# Patient Record
Sex: FEMALE | Race: WHITE | NOT HISPANIC OR LATINO | Employment: FULL TIME | URBAN - METROPOLITAN AREA
[De-identification: names, ages, dates, MRNs, and addresses within clinical notes are randomized per-mention and may not be internally consistent; named-entity substitution may affect disease eponyms.]

---

## 2017-05-17 ENCOUNTER — OFFICE VISIT (OUTPATIENT)
Dept: OBSTETRICS AND GYNECOLOGY | Facility: CLINIC | Age: 32
End: 2017-05-17
Payer: MEDICAID

## 2017-05-17 VITALS — HEIGHT: 63 IN | WEIGHT: 148 LBS | BODY MASS INDEX: 26.22 KG/M2

## 2017-05-17 DIAGNOSIS — B00.9 HSV (HERPES SIMPLEX VIRUS) INFECTION: ICD-10-CM

## 2017-05-17 PROCEDURE — 99202 OFFICE O/P NEW SF 15 MIN: CPT | Mod: PBBFAC,PO | Performed by: ADVANCED PRACTICE MIDWIFE

## 2017-05-17 PROCEDURE — 99999 PR PBB SHADOW E&M-NEW PATIENT-LVL II: CPT | Mod: PBBFAC,,, | Performed by: ADVANCED PRACTICE MIDWIFE

## 2017-05-17 PROCEDURE — 99203 OFFICE O/P NEW LOW 30 MIN: CPT | Mod: S$PBB,,, | Performed by: ADVANCED PRACTICE MIDWIFE

## 2017-05-17 RX ORDER — VALACYCLOVIR HYDROCHLORIDE 500 MG/1
500 TABLET, FILM COATED ORAL DAILY
Qty: 30 TABLET | Refills: 2 | Status: SHIPPED | OUTPATIENT
Start: 2017-05-17 | End: 2021-10-25

## 2017-05-17 NOTE — MR AVS SNAPSHOT
[facility]  [address] [place] [address]  Phone: [phone]  Fax: [phone]                  Shannon De Jesus   2017 2:00 PM   Office Visit    Description:  Female : 1985   Provider:  Sil Barrientos CNM   Department:  [facility]           Reason for Visit     Risk assessment           Diagnoses this Visit        Comments    HSV (herpes simplex virus) infection                To Do List           Future Appointments        Provider Department Dept Phone    2017 8:00 AM Malorie Musa CNM [facility]/ -[phone]      Goals (5 Years of Data)     None      Follow-Up and Disposition     Return in about 2 weeks (around 2017).    Follow-up and Disposition History       These Medications        Disp Refills Start End    valacyclovir (VALTREX) 500 MG tablet 30 tablet 2 2017 8/15/2017    Take 1 tablet (500 mg total) by mouth once daily. - Oral    Pharmacy: [facility] [address] [place] [address] #: [phone]         [facility]     [facility] On Call Nurse Care Line -  Assistance  Unless otherwise directed by your provider, please contact [facility], our nurse care line that is available for  assistance.     Registered nurses in the [facility] provide: appointment scheduling, clinical advisement, health education, and other advisory services.  Call: [phone] (toll free)               Medications           Message regarding Medications     Verify the changes and/or additions to your medication regime listed below are the same as discussed with your clinician today.  If any of these changes or additions are incorrect, please notify your healthcare provider.        START taking these NEW medications        Refills    valacyclovir (VALTREX) 500 MG tablet 2    Sig: Take 1 tablet (500 mg total) by mouth once daily.    Class: Normal    Route: Oral           Verify that the below list of medications  "is an accurate representation of the medications you are currently taking.  If none reported, the list may be blank. If incorrect, please contact your healthcare provider. Carry this list with you in case of emergency.           Current Medications     valacyclovir (VALTREX) 500 MG tablet Take 1 tablet (500 mg total) by mouth once daily.           Clinical Reference Information           Your Vitals Were     Height Weight Last Period BMI       5' 3" (1.6 m) 67.1 kg (148 lb) 04/08/2017 (Exact Date) 26.22 kg/m2       Allergies as of 5/17/2017     No Known Allergies      Immunizations Administered on Date of Encounter - 5/17/2017     None      [facility] Sign-Up     Activating your [facility] account is as easy as 1-2-3!     1) Visit [email or web address], select Sign Up Now, enter this activation code and your date of birth, then select Next.  [email or web address]  Expires: 7/1/2017  2:42 PM      2) Create a username and password to use when you visit [facility] in the future and select a security question in case you lose your password and select Next.    3) Enter your e-mail address and click Sign Up!    Additional Information  If you have questions, please e-mail [email or web address] or call [phone] to talk to our [facility] staff. Remember, [facility] is NOT to be used for urgent needs. For medical emergencies, dial 911.         Smoking Cessation     If you would like to quit smoking:   You may be eligible for free services if you are a [place] resident and started smoking cigarettes before September 1, 1988.  Call the Smoking Cessation [employer] (SCT) toll free at (599) 987-6646 or (028) 234-0131.   Call 5-800-QUIT-NOW if you do not meet the above criteria.   Contact us via email: [email or web address]   View our website for more information: [email or web address]        Language Assistance Services     ATTENTION: Language assistance services are available, free of charge. Please call [phone].  "     ATENCIÓN: Si habla español, tiene a garcia disposición servicios gratuitos de asistencia lingüística. Rinku al [phone].     CHÚ Ý: N?u b?n nói Ti?ng Vi?t, có các d?ch v? h? tr? ngôn ng? mi?n phí dành cho b?n. G?i s? [phone].         [facility] complies with applicable Federal civil rights laws and does not discriminate on the basis of race, color, national origin, age, disability, or sex.

## 2017-05-17 NOTE — PROGRESS NOTES
"  Subjective:      Shannon De Jesus is a 31 y.o. female who presents for evaluation of amenorrhea. She believes she could be pregnant. Pregnancy is not desired. Sexual Activity: single partner, contraception: none. Current symptoms also include: positive home pregnancy test. Last period was normal.Has current HSV outbreak     Patient's last menstrual period was 2017 (exact date).  The following portions of the patient's history were reviewed and updated as appropriate: allergies, current medications, past family history, past medical history, past social history, past surgical history and problem list.    Review of Systems  A comprehensive review of systems was negative.       Objective:      Ht 5' 3" (1.6 m)  Wt 67.1 kg (148 lb)  LMP 2017 (Exact Date)  BMI 26.22 kg/m2  General: alert, appears stated age, no distress and no acute distress      Lab Review  Urine HCG: positive      Assessment:      Absence of menstruation.       Plan:      Pregnancy Test: Positive: EDC: 1/15/18. Briefly discussed pre- care options. Pregnancy, Childbirth and the [place] book given. Encouraged well-balanced diet, plenty of rest when needed, pre-gorge vitamins daily and walking for exercise. Discussed self-help for nausea, avoiding OTC medications until consulting provider or pharmacist, other than Tylenol as needed, minimal caffeine (1-2 cups daily) and avoiding alcohol. She will schedule her initial OB visit in the next month with her PCP or OB provider. Feel free to call with any questions. patient plans termination  Will return for visit and birth control after termination  "

## 2018-08-04 ENCOUNTER — HOSPITAL ENCOUNTER (EMERGENCY)
Facility: HOSPITAL | Age: 33
Discharge: HOME | End: 2018-08-04
Attending: EMERGENCY MEDICINE
Payer: MEDICAID

## 2018-08-04 VITALS
WEIGHT: 170 LBS | TEMPERATURE: 98 F | BODY MASS INDEX: 30.11 KG/M2 | DIASTOLIC BLOOD PRESSURE: 63 MMHG | SYSTOLIC BLOOD PRESSURE: 112 MMHG | OXYGEN SATURATION: 97 % | RESPIRATION RATE: 18 BRPM | HEART RATE: 74 BPM

## 2018-08-04 DIAGNOSIS — I10 HYPERTENSION, UNSPECIFIED TYPE: ICD-10-CM

## 2018-08-04 DIAGNOSIS — O20.0 THREATENED MISCARRIAGE: Primary | ICD-10-CM

## 2018-08-04 DIAGNOSIS — Z67.91 RH NEGATIVE, ANTEPARTUM, FIRST TRIMESTER: ICD-10-CM

## 2018-08-04 DIAGNOSIS — O26.891 RH NEGATIVE, ANTEPARTUM, FIRST TRIMESTER: ICD-10-CM

## 2018-08-04 LAB
ABO + RH BLD: NORMAL
ALBUMIN SERPL BCP-MCNC: 3.6 G/DL
ALP SERPL-CCNC: 44 U/L
ALT SERPL W/O P-5'-P-CCNC: 11 U/L
ANION GAP SERPL CALC-SCNC: 9 MMOL/L
AST SERPL-CCNC: 16 U/L
BASOPHILS # BLD AUTO: 0.01 K/UL
BASOPHILS NFR BLD: 0.1 %
BILIRUB SERPL-MCNC: 0.5 MG/DL
BILIRUB UR QL STRIP: NEGATIVE
BUN SERPL-MCNC: 6 MG/DL
CALCIUM SERPL-MCNC: 9.1 MG/DL
CHLORIDE SERPL-SCNC: 106 MMOL/L
CLARITY UR: CLEAR
CO2 SERPL-SCNC: 22 MMOL/L
COLOR UR: YELLOW
CREAT SERPL-MCNC: 0.7 MG/DL
DIFFERENTIAL METHOD: NORMAL
EOSINOPHIL # BLD AUTO: 0.3 K/UL
EOSINOPHIL NFR BLD: 3.6 %
ERYTHROCYTE [DISTWIDTH] IN BLOOD BY AUTOMATED COUNT: 14 %
EST. GFR  (AFRICAN AMERICAN): >60 ML/MIN/1.73 M^2
EST. GFR  (NON AFRICAN AMERICAN): >60 ML/MIN/1.73 M^2
GLUCOSE SERPL-MCNC: 91 MG/DL
GLUCOSE UR QL STRIP: NEGATIVE
HCG INTACT+B SERPL-ACNC: NORMAL MIU/ML
HCT VFR BLD AUTO: 39.3 %
HGB BLD-MCNC: 13.4 G/DL
HGB UR QL STRIP: NEGATIVE
INJECT RH IG VOL PATIENT: NORMAL ML
KETONES UR QL STRIP: NEGATIVE
LEUKOCYTE ESTERASE UR QL STRIP: NEGATIVE
LYMPHOCYTES # BLD AUTO: 1.7 K/UL
LYMPHOCYTES NFR BLD: 20.7 %
MCH RBC QN AUTO: 30.1 PG
MCHC RBC AUTO-ENTMCNC: 34.1 G/DL
MCV RBC AUTO: 88 FL
MONOCYTES # BLD AUTO: 0.5 K/UL
MONOCYTES NFR BLD: 6.5 %
NEUTROPHILS # BLD AUTO: 5.8 K/UL
NEUTROPHILS NFR BLD: 69.1 %
NITRITE UR QL STRIP: NEGATIVE
PH UR STRIP: 8 [PH] (ref 5–8)
PLATELET # BLD AUTO: 196 K/UL
PMV BLD AUTO: 10.5 FL
POTASSIUM SERPL-SCNC: 4 MMOL/L
PROT SERPL-MCNC: 6.8 G/DL
PROT UR QL STRIP: NEGATIVE
RBC # BLD AUTO: 4.45 M/UL
SODIUM SERPL-SCNC: 137 MMOL/L
SP GR UR STRIP: 1.01 (ref 1–1.03)
URN SPEC COLLECT METH UR: NORMAL
UROBILINOGEN UR STRIP-ACNC: NEGATIVE EU/DL
WBC # BLD AUTO: 8.34 K/UL

## 2018-08-04 PROCEDURE — 86901 BLOOD TYPING SEROLOGIC RH(D): CPT

## 2018-08-04 PROCEDURE — 99285 EMERGENCY DEPT VISIT HI MDM: CPT | Mod: 25

## 2018-08-04 PROCEDURE — 63600519 RHOGAM PHARM REV CODE 636 ALT 250 W HCPCS: Performed by: PHYSICIAN ASSISTANT

## 2018-08-04 PROCEDURE — 84702 CHORIONIC GONADOTROPIN TEST: CPT

## 2018-08-04 PROCEDURE — 81003 URINALYSIS AUTO W/O SCOPE: CPT

## 2018-08-04 PROCEDURE — 85025 COMPLETE CBC W/AUTO DIFF WBC: CPT

## 2018-08-04 PROCEDURE — 80053 COMPREHEN METABOLIC PANEL: CPT

## 2018-08-04 RX ADMIN — HUMAN RHO(D) IMMUNE GLOBULIN 300 MCG: 300 INJECTION, SOLUTION INTRAMUSCULAR at 01:08

## 2018-08-04 NOTE — ED PROVIDER NOTES
"Encounter Date: 2018       History     Chief Complaint   Patient presents with    Vaginal Bleeding     vaginal bleeding, 12 weeks pregnant     Pt states she is approximately 12 weeks pregnant by last menstrual period.  Pt has no prenatal care.  Pt denies trauma/fall or other precipitating event prior to onset of bleeding, which she describes as "spotting of old blood."      The history is provided by the patient.   Vaginal Bleeding   This is a new problem. The current episode started 6 to 12 hours ago. The problem occurs rarely. The problem has not changed since onset.Pertinent negatives include no chest pain, no abdominal pain, no headaches and no shortness of breath. Nothing aggravates the symptoms. Nothing relieves the symptoms. She has tried nothing for the symptoms.     Review of patient's allergies indicates:  No Known Allergies  History reviewed. No pertinent past medical history.  Past Surgical History:   Procedure Laterality Date     SECTION      x 1    WISDOM TOOTH EXTRACTION       Family History   Problem Relation Age of Onset    Miscarriages / Stillbirths Mother     Breast cancer Neg Hx     Cancer Neg Hx     Colon cancer Neg Hx     Diabetes Neg Hx     Eclampsia Neg Hx     Ovarian cancer Neg Hx      labor Neg Hx     Stroke Neg Hx      Social History   Substance Use Topics    Smoking status: Current Some Day Smoker    Smokeless tobacco: Not on file    Alcohol use No     Review of Systems   Constitutional: Negative for chills and fever.   HENT: Negative for sore throat.    Eyes: Negative for photophobia and redness.   Respiratory: Negative for cough and shortness of breath.    Cardiovascular: Negative for chest pain.   Gastrointestinal: Negative for abdominal pain, diarrhea and nausea.   Endocrine: Negative for polydipsia and polyphagia.   Genitourinary: Positive for vaginal bleeding. Negative for decreased urine volume, dysuria, frequency, genital sores, hematuria, " menstrual problem, pelvic pain, urgency, vaginal discharge and vaginal pain.   Musculoskeletal: Negative for arthralgias, back pain and myalgias.   Skin: Negative for rash.   Neurological: Negative for weakness and headaches.   Hematological: Does not bruise/bleed easily.   Psychiatric/Behavioral: The patient is not nervous/anxious.    All other systems reviewed and are negative.      Physical Exam     Initial Vitals [08/04/18 1015]   BP Pulse Resp Temp SpO2   (!) 143/67 67 18 97.9 °F (36.6 °C) 97 %      MAP       --         Physical Exam    Nursing note and vitals reviewed.  Constitutional: Vital signs are normal. She appears well-developed and well-nourished. No distress.   HENT:   Head: Normocephalic and atraumatic.   Right Ear: External ear normal.   Left Ear: External ear normal.   Nose: Nose normal.   Mouth/Throat: Oropharynx is clear and moist.   Eyes: Conjunctivae, EOM and lids are normal. Pupils are equal, round, and reactive to light.   Neck: Normal range of motion and full passive range of motion without pain. Neck supple.   Cardiovascular: Normal rate, regular rhythm, S1 normal, S2 normal, normal heart sounds, intact distal pulses and normal pulses.   Pulmonary/Chest: Breath sounds normal. No respiratory distress. She has no wheezes. She has no rales.   Abdominal: Soft. Normal appearance and bowel sounds are normal. She exhibits no distension. There is no tenderness.   Musculoskeletal: Normal range of motion.   Lymphadenopathy:     She has no cervical adenopathy.   Neurological: She is alert and oriented to person, place, and time. She has normal strength. No cranial nerve deficit or sensory deficit. Coordination and gait normal.   Skin: Skin is warm, dry and intact.   Psychiatric: She has a normal mood and affect. Her speech is normal and behavior is normal. Judgment and thought content normal. Cognition and memory are normal.         ED Course   Procedures  Labs Reviewed   COMPREHENSIVE METABOLIC PANEL  - Abnormal; Notable for the following:        Result Value    CO2 22 (*)     Alkaline Phosphatase 44 (*)     All other components within normal limits   CBC W/ AUTO DIFFERENTIAL   HCG, QUANTITATIVE, PREGNANCY   URINALYSIS   GROUP & RH   PREPARE RH IMMUNE GLOBULIN          Imaging Results          US OB Less Than 14 Wks First Gestation (Final result)  Result time 08/04/18 12:09:56   Procedure changed from US OB Less Than 14 Wks with Transvag(xpd     Final result by Ahsan Willson MD (08/04/18 12:09:56)                 Impression:      There is a single viable intrauterine pregnancy with estimated gestational age of 11 weeks and 6 days.  Heart rate is 157 beats per minute.      Electronically signed by: Aman Willson MD  Date:    08/04/2018  Time:    12:09             Narrative:    EXAMINATION:  US OB LESS THAN 14 WEEKS FIRST GESTATION    CLINICAL HISTORY:  Vag Bleeding;    TECHNIQUE:  Transabdominal 2nd or 3rd trimester obstetrical ultrasound was performed.    COMPARISON:  None.    FINDINGS:  Ultrasound imaging demonstrates a single intrauterine pregnancy.  Gestational sac is present with crown-rump length measuring 5.13 cm with an estimated gestational age of 11 weeks 6 days and estimated due date of February 11, 2019.  The heart rate measures 157 beats per minute.  No focal abnormality gestational sac is demonstrated.  The uterus measures 13.30 x 6.41 x 8.64 cm.  The right ovary measures 2.1 x 1.5 x 2.5 cm.  The left ovary measures 2.8 x 1.2 x 2.3 cm.  Bilaterally the ovaries have normal vascular flow and appear free of focal abnormality.  No suspicious mass or fluid collections are demonstrated.                                                      Clinical Impression:   The primary encounter diagnosis was Threatened miscarriage. Diagnoses of Rh negative, antepartum, first trimester and Hypertension, unspecified type were also pertinent to this visit.      Disposition:   Disposition: Discharged  Condition:  DICK Boo  08/04/18 0755

## 2020-02-16 ENCOUNTER — HISTORICAL (OUTPATIENT)
Dept: ADMINISTRATIVE | Facility: HOSPITAL | Age: 35
End: 2020-02-16

## 2023-12-26 ENCOUNTER — HOSPITAL ENCOUNTER (EMERGENCY)
Facility: HOSPITAL | Age: 38
Discharge: HOME | End: 2023-12-26
Attending: EMERGENCY MEDICINE
Payer: MEDICAID

## 2023-12-26 VITALS
DIASTOLIC BLOOD PRESSURE: 86 MMHG | RESPIRATION RATE: 16 BRPM | TEMPERATURE: 98 F | BODY MASS INDEX: 31.42 KG/M2 | SYSTOLIC BLOOD PRESSURE: 144 MMHG | WEIGHT: 177.38 LBS | HEART RATE: 66 BPM | OXYGEN SATURATION: 100 %

## 2023-12-26 DIAGNOSIS — U07.1 COVID: Primary | ICD-10-CM

## 2023-12-26 DIAGNOSIS — Z76.0 MEDICATION REFILL: ICD-10-CM

## 2023-12-26 LAB
INFLUENZA A, MOLECULAR: NEGATIVE
INFLUENZA B, MOLECULAR: NEGATIVE
SARS-COV-2 RDRP RESP QL NAA+PROBE: POSITIVE
SPECIMEN SOURCE: NORMAL

## 2023-12-26 PROCEDURE — U0002 COVID-19 LAB TEST NON-CDC: HCPCS | Performed by: NURSE PRACTITIONER

## 2023-12-26 PROCEDURE — 99282 EMERGENCY DEPT VISIT SF MDM: CPT

## 2023-12-26 PROCEDURE — 87502 INFLUENZA DNA AMP PROBE: CPT | Performed by: NURSE PRACTITIONER

## 2023-12-26 RX ORDER — METFORMIN HYDROCHLORIDE 500 MG/1
500 TABLET ORAL
Qty: 30 TABLET | Refills: 1 | Status: SHIPPED | OUTPATIENT
Start: 2023-12-26

## 2023-12-26 NOTE — FIRST PROVIDER EVALUATION
Medical screening examination initiated.  I have conducted a focused provider triage encounter, findings are as follows:    Brief history of present illness:  patient presents to ER for sinus pressure, chills, weakness.    There were no vitals filed for this visit.    Pertinent physical exam:  no acute distress    Brief workup plan:  covid/influenza    Preliminary workup initiated; this workup will be continued and followed by the physician or advanced practice provider that is assigned to the patient when roomed.  
87

## 2023-12-26 NOTE — Clinical Note
"Shannon Phamjoaquín Palma was seen and treated in our emergency department on 12/26/2023.  She may return to work on 12/30/2023.       If you have any questions or concerns, please don't hesitate to call.      Alma Argueta, WANG"

## 2023-12-27 NOTE — ED PROVIDER NOTES
History      Chief Complaint   Patient presents with    General Illness     Pt c/o body aches, sinus pressure, weakness, dizzy, nausea, chills.  Symptoms began two days ago.       Review of patient's allergies indicates:  No Known Allergies     HPI   HPI    2023, 4:45 PM   History obtained from the patient      History of Present Illness: Shannon Palma is a 38 y.o. female patient who presents to the Emergency Department for flu-like symptoms, cough, nasal congestion, fever, body aches, for 2 days.       PCP: [facility]       Past Medical History:  Past Medical History:   Diagnosis Date    Chlamydia     Genital herpes     Hypertension in pregnancy          Past Surgical History:  Past Surgical History:   Procedure Laterality Date     SECTION      ,    WISDOM TOOTH EXTRACTION             Family History:  Family History   Problem Relation Age of Onset    Miscarriages / Stillbirths Mother     Diabetes Mother     Heart disease Maternal Grandmother     Lung cancer Maternal Grandmother     Hypertension Maternal Grandfather     Sickle cell anemia Other         Family Hx on baby's daddy's family hx    Prostate cancer Father 48    Skin cancer Maternal Aunt     Breast cancer Neg Hx     Cancer Neg Hx     Colon cancer Neg Hx     Eclampsia Neg Hx     Ovarian cancer Neg Hx      labor Neg Hx     Stroke Neg Hx            Social History:  Social History     Tobacco Use    Smoking status: Never    Smokeless tobacco: Never   Substance and Sexual Activity    Alcohol use: No    Drug use: No    Sexual activity: Yes     Partners: Male     Birth control/protection: I.U.D.     Comment: Lillie GALICIA   Review of Systems   Constitutional: Positive for chills and fever.     Respiratory: Positive for cough. Negative for shortness of breath.        Review of Systems    Physical Exam        Initial Vitals [23 1201]   BP Pulse Resp Temp SpO2   (!) 144/86 66 16 97.9 °F (36.6 °C) 100  %      MAP       --         Physical Exam  Vital signs and nursing notes reviewed.  Constitutional: Patient is in NAD. Awake and alert. Well-developed and well-nourished.  Head: Atraumatic. Normocephalic.  Eyes: PERRL. EOM intact. Conjunctivae nl. No scleral icterus.  ENT: Mucous membranes are moist. Oropharynx is mildly erythematous, no exudates.  Nasal congestion.    Neck: Supple.  No meningismus  Cardiovascular: Regular rate and rhythm. No murmurs, rubs, or gallops. Distal pulses are 2+ and symmetric.  Pulmonary/Chest: No respiratory distress. Clear to auscultation bilaterally. No wheezing, rales, or rhonchi.  Abdominal: Soft. Non-distended. No TTP. No rebound, guarding, or rigidity. Good bowel sounds.  Genitourinary: No CVA tenderness  Musculoskeletal: Moves all extremities. No edema.   Skin: Warm and dry.  No rash  Neurological: Awake and alert. No acute focal neurological deficits are appreciated.  Psychiatric: Normal affect. Good eye contact. Appropriate in content.      ED Course      Procedures  ED Vital Signs:  Vitals:    12/26/23 1201   BP: (!) 144/86   Pulse: 66   Resp: 16   Temp: 97.9 °F (36.6 °C)   TempSrc: Oral   SpO2: 100%   Weight: 80.4 kg (177 lb 5.8 oz)         Results for orders placed or performed during the hospital encounter of 12/26/23   Influenza A & B by Molecular    Specimen: Nasal Swab   Result Value Ref Range    Influenza A, Molecular Negative Negative    Influenza B, Molecular Negative Negative    Flu A & B Source NP    COVID-19 Rapid Screening   Result Value Ref Range    SARS-CoV-2 RNA, Amplification, Qual Positive (A) Negative             Imaging Results:  Imaging Results    None            The Emergency Provider reviewed the vital signs and test results, which are outlined above.    ED Discussion         All findings were reviewed with the patient/family in detail.   All remaining questions and concerns were addressed at that time.  Patient/family has been counseled regarding the  need for follow-up as well as the indication to return to the emergency room should new or worrisome developments occur.        Medication(s) given in the ER:  Medications - No data to display         Follow-up Information       [facility] In 2 days.    Why: As needed  Contact information:  [address]  [place] [address]  [phone]                                    Medication List        CHANGE how you take these medications      * metFORMIN 500 MG tablet  Commonly known as: GLUCOPHAGE  What changed: Another medication with the same name was added. Make sure you understand how and when to take each.     * metFORMIN 500 MG tablet  Commonly known as: GLUCOPHAGE  Take 1 tablet (500 mg total) by mouth daily with breakfast.  What changed: You were already taking a medication with the same name, and this prescription was added. Make sure you understand how and when to take each.           * This list has 2 medication(s) that are the same as other medications prescribed for you. Read the directions carefully, and ask your doctor or other care provider to review them with you.                ASK your doctor about these medications      ergocalciferol 50,000 unit Cap  Commonly known as: ERGOCALCIFEROL     KYLEENA 17.5 mcg/24 hrs (5 yrs) 19.5 mg IUD  Generic drug: levonorgestreL     valACYclovir 1000 MG tablet  Commonly known as: VALTREX     VYVANSE 40 MG Cap  Generic drug: lisdexamfetamine     ZOLOFT ORAL               Where to Get Your Medications        These medications were sent to [facility] [address] [place] - [address] [facility]  [address] [facility] [place] [address]      Phone: [phone]   metFORMIN 500 MG tablet             Medical Decision Making        MDM     Amount and/or Complexity of Data Reviewed  Clinical lab tests: ordered and reviewed                   Clinical Impression:        ICD-10-CM ICD-9-CM   1. COVID  U07.1 079.89   2. Medication refill   Z76.0 V68.1               Alma Argueta, PATristinC  12/27/23 1647